# Patient Record
Sex: FEMALE | Race: WHITE | NOT HISPANIC OR LATINO | Employment: UNEMPLOYED | URBAN - NONMETROPOLITAN AREA
[De-identification: names, ages, dates, MRNs, and addresses within clinical notes are randomized per-mention and may not be internally consistent; named-entity substitution may affect disease eponyms.]

---
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
## 2022-02-09 ENCOUNTER — OFFICE VISIT (OUTPATIENT)
Dept: FAMILY MEDICINE | Facility: CLINIC | Age: 22
End: 2022-02-09

## 2022-02-09 VITALS
DIASTOLIC BLOOD PRESSURE: 74 MMHG | TEMPERATURE: 99 F | RESPIRATION RATE: 20 BRPM | HEIGHT: 66 IN | HEART RATE: 111 BPM | OXYGEN SATURATION: 99 % | SYSTOLIC BLOOD PRESSURE: 128 MMHG | WEIGHT: 150 LBS | BODY MASS INDEX: 24.11 KG/M2

## 2022-02-09 DIAGNOSIS — E10.628 TYPE 1 DIABETES MELLITUS WITH OTHER SKIN COMPLICATION: ICD-10-CM

## 2022-02-09 DIAGNOSIS — N91.2 AMENORRHEA: ICD-10-CM

## 2022-02-09 DIAGNOSIS — A63.0 GENITAL WARTS: Primary | ICD-10-CM

## 2022-02-09 LAB
B-HCG UR QL: NEGATIVE
CTP QC/QA: YES

## 2022-02-09 PROCEDURE — 81025 POCT URINE PREGNANCY: ICD-10-PCS | Mod: QW,,, | Performed by: NURSE PRACTITIONER

## 2022-02-09 PROCEDURE — 99203 OFFICE O/P NEW LOW 30 MIN: CPT | Mod: ,,, | Performed by: NURSE PRACTITIONER

## 2022-02-09 PROCEDURE — 81025 URINE PREGNANCY TEST: CPT | Mod: QW,,, | Performed by: NURSE PRACTITIONER

## 2022-02-09 PROCEDURE — 99203 PR OFFICE/OUTPT VISIT, NEW, LEVL III, 30-44 MIN: ICD-10-PCS | Mod: ,,, | Performed by: NURSE PRACTITIONER

## 2022-02-09 RX ORDER — IMIQUIMOD 12.5 MG/.25G
CREAM TOPICAL
Qty: 12 PACKET | Refills: 0 | Status: SHIPPED | OUTPATIENT
Start: 2022-02-09

## 2022-02-09 NOTE — PROGRESS NOTES
Millie Sequeira DNP, GRAHAM    [address] [place] [address]     PATIENT NAME: Noni Myles  : 2000  DATE: 22  MRN: [id number]      Billing Provider: Millie Sequeira DNP, GRAHAM  Level of Service:   Patient PCP Information     Provider PCP Type    Primary Doctor No General          Reason for Visit / Chief Complaint: Lesion (Skin lesions in vaginal area; patient stated that she's been experiecing this X1 month.  Stated that she and her boyfriend have been together X7 months.  ) and Pain (Patient stated that she experiences vaginal pain after intercourse)       Update PCP  Update Chief Complaint         History of Present Illness / Problem Focused Workflow     Noni Myles presents to the clinic with Lesion (Skin lesions in vaginal area; patient stated that she's been experiecing this X1 month.  Stated that she and her boyfriend have been together X7 months.  ) and Pain (Patient stated that she experiences vaginal pain after intercourse)     Pt states she has had lesions for approx 1 month. Pt states she tried to shave them off and they grew back more. Pt c/o pain only after shaving. Pt states intercouse is now painful.       Review of Systems     Review of Systems   Constitutional: Negative for fatigue and fever.   HENT: Negative for ear pain, postnasal drip, rhinorrhea and sinus pressure/congestion.    Respiratory: Negative for cough and shortness of breath.    Cardiovascular: Negative for chest pain.   Gastrointestinal: Negative for abdominal pain, diarrhea, nausea and vomiting.   Genitourinary: Positive for dyspareunia and genital sores. Negative for dysuria.   Neurological: Negative for headaches.        Medical / Social / Family History     Past Medical History:   Diagnosis Date    Type 1 diabetes        History reviewed. No pertinent surgical history.    Social History    reports that she has been smoking. She has never used smokeless tobacco.  She reports current alcohol use. She reports that she does not use drugs.    Family History  's family history includes Cervical cancer in her mother; Chiari malformation in her mother.    Medications and Allergies     Medications  Outpatient Medications Marked as Taking for the 2/9/22 encounter (Office Visit) with Millie Sequeira, FLACO, FNP   Medication Sig Dispense Refill    blood sugar diagnostic (FREESTYLE LITE STRIPS) Strp Use to test BG with Omnipod Device 6-8 times daily (Patient taking differently: 1 each by Other route 6 (six) times daily. Use to test BG with Omnipod Device 6-8 times daily) 250 each 11    glucagon, human recombinant, (GLUCAGON EMERGENCY KIT, HUMAN,) 1 mg SolR Inject 1 mg into the muscle as needed. 1 each 11    insulin aspart U-100 (NOVOLOG) 100 unit/mL (3 mL) InPn pen Use as directed per insulin pump, up to 150 units/day 45 mL 5    subcutaneous insulin pump (OMNIPOD INSULIN MANAGEMENT MISC) by Misc.(Non-Drug; Combo Route) route.      urine acetone test,strips (KETONE URINE TEST MISC) by Misc.(Non-Drug; Combo Route) route.         Allergies  Review of patient's allergies indicates:   Allergen Reactions    Sunscreen        Physical Examination     Vitals:    02/09/22 1404   BP: 128/74   Pulse: (!) 111   Resp: 20   Temp: 98.9 °F (37.2 °C)     Physical Exam  Vitals and nursing note reviewed.   Constitutional:       General: She is not in acute distress.  HENT:      Nose: Nose normal.      Mouth/Throat:      Mouth: Mucous membranes are moist.   Eyes:      Pupils: Pupils are equal, round, and reactive to light.   Cardiovascular:      Rate and Rhythm: Normal rate and regular rhythm.      Pulses: Normal pulses.      Heart sounds: Normal heart sounds. No murmur heard.      Pulmonary:      Effort: Pulmonary effort is normal. No respiratory distress.      Breath sounds: Normal breath sounds. No wheezing, rhonchi or rales.   Chest:      Chest wall: No tenderness.   Abdominal:      General:  Bowel sounds are normal.      Palpations: Abdomen is soft.   Genitourinary:     Labia:         Left: Lesion present.        Musculoskeletal:         General: Normal range of motion.      Cervical back: Normal range of motion and neck supple.      Right lower leg: No edema.      Left lower leg: No edema.   Skin:     General: Skin is warm and dry.   Neurological:      General: No focal deficit present.      Mental Status: She is alert and oriented to person, place, and time.          Assessment and Plan (including Health Maintenance)      Problem List  Smart Sets  Document Outside HM   :    Plan:     Pt states last HgbA1c was 10. Pt in on insulin pump. Dx at age 5. Seeing diabetic specialist in [place].   Recommend referral to OBGYN for treatment. Pt requests to try topical and if no improvement will go to OBGYN due to no insurance.       Health Maintenance Due   Topic Date Due    Hepatitis C Screening  Never done    Diabetes Urine Screening  Never done    COVID-19 Vaccine (1) Never done    Pneumococcal Vaccines (Age 0-64) (1 of 2 - PPSV23) Never done    Foot Exam  Never done    Eye Exam  Never done    HPV Vaccines (1 - 2-dose series) Never done    HIV Screening  Never done    TETANUS VACCINE  Never done    Influenza Vaccine (1) Never done       Problem List Items Addressed This Visit    None     Visit Diagnoses     Genital warts    -  Primary    Relevant Medications    imiquimod (ALDARA) 5 % cream    Amenorrhea        Relevant Orders    POCT urine pregnancy (Completed)    Type 1 diabetes mellitus with other skin complication            Genital warts  -     imiquimod (ALDARA) 5 % cream; Apply topically 3 (three) times a week.  Dispense: 12 packet; Refill: 0    Amenorrhea  -     POCT urine pregnancy    Type 1 diabetes mellitus with other skin complication       Health Maintenance Topics with due status: Not Due       Topic Last Completion Date    Lipid Panel 01/13/2022    Hemoglobin A1c 01/13/2022           No  future appointments.     Follow up in about 4 weeks (around 3/9/2022).     Signature:  Millie Sequeira DNP, FNP  [address] [place] [address]  Phone #: [phone]  Fax #: [phone]    Date of encounter: 2/9/22    Patient Instructions   Use medication 3 times a week. Avoid tub baths and sexual intercourse.